# Patient Record
Sex: FEMALE | Race: WHITE | NOT HISPANIC OR LATINO | URBAN - METROPOLITAN AREA
[De-identification: names, ages, dates, MRNs, and addresses within clinical notes are randomized per-mention and may not be internally consistent; named-entity substitution may affect disease eponyms.]

---

## 2020-03-19 PROBLEM — 301716002 LEFT LOWER QUADRANT PAIN: Status: ACTIVE | Noted: 2019-12-09

## 2020-03-19 PROBLEM — 64226004: Status: ACTIVE | Noted: 2019-12-09

## 2020-03-19 PROBLEM — 441988000 IMAGING OF ABDOMEN ABNORMAL: Status: ACTIVE | Noted: 2019-12-09

## 2020-07-04 ENCOUNTER — WEB ENCOUNTER (OUTPATIENT)
Dept: URBAN - METROPOLITAN AREA CLINIC 33 | Facility: CLINIC | Age: 40
End: 2020-07-04

## 2020-07-06 ENCOUNTER — OFFICE VISIT (OUTPATIENT)
Dept: URBAN - METROPOLITAN AREA CLINIC 33 | Facility: CLINIC | Age: 40
End: 2020-07-06

## 2020-07-06 RX ORDER — LEVOCETIRIZINE DIHYDROCHLORIDE 5 MG/1
1 TABLET IN THE EVENING TABLET, FILM COATED ORAL ONCE A DAY
Qty: 30 | Status: ACTIVE | COMMUNITY

## 2020-07-06 RX ORDER — CALCIUM CARBONATE 500 MG/1
1 TABLET TABLET ORAL AS NEEDED
Status: ACTIVE | COMMUNITY

## 2020-07-06 RX ORDER — OMEPRAZOLE 20 MG/1
1 CAPSULE CAPSULE, DELAYED RELEASE ORAL ONCE A DAY
Qty: 90 CAPSULE | Status: ACTIVE | COMMUNITY

## 2020-07-06 RX ORDER — FAMOTIDINE 40 MG/1
1 TABLET AT BEDTIME AS NEEDED TABLET, FILM COATED ORAL ONCE A DAY
Qty: 90 TABLET | Refills: 1 | Status: ACTIVE | COMMUNITY
Start: 2019-12-09

## 2020-07-06 RX ORDER — OMEPRAZOLE 20 MG/1
AS DIRECTED TABLET, DELAYED RELEASE ORAL
Qty: 90 | Refills: 1 | Status: ACTIVE | COMMUNITY
Start: 2020-03-19

## 2020-07-06 RX ORDER — SUCRALFATE 1 G/1
1 TABLET ON AN EMPTY STOMACH TABLET ORAL TWICE A DAY
Qty: 60 | Refills: 2 | Status: ACTIVE | COMMUNITY
Start: 2020-03-19

## 2020-07-06 NOTE — HPI-MIGRATED HPI
;   ;   ;   ;     Early Satiety : Patient admits/denies epsiodes since last visit.   Last visit 12/9/2020) Patient c/o early satiety for the last 3 weeks. She admits that she has had a decreased appetite. ;   Heartburn :   ?Start Omeprazole 20 MG 1 QD, Sucralfate 1 GM, 1 BID Notes: Patient has been advised to take Sucralfate instead of Omeprazole due to patient's Colitis.        Last visit 12/9/2020)It has been controlled with medications ;   Abdominal Pain : Last visit Patient admit/denies epsiodes since last visit.    Last visit (12/9/2020) Patient admits LLQ abdominal pain since her last office visit only after eating fatty foods. She admits that her abdominal pain is all food related.      Patient admits that she tried following the Low FODMAP Diet.   Patient admits that she is taking Famotidine 20 mg. She denies that her symptoms are controlled  Last visit (12/9/2019) She admits episodes of abdominal pain has decreased since her last office visit. She admits that it ususal only hurts now when she eats something that she shouldn't   She denies having any labs completed or recent imaging.  She denies any new concerns at this time.   She has intolerance to fructose , lactose , fructans, galactans , polyols, sucrose    Last visit (10/4/2019) Patient admits abdominal pain that is located in her LLQ and is described as an intermittent sharp pain.  Pain started 08/22/19 and the patient states that she will have 2-3 episodes per day.    Patient admits 1-3 bowel movements a day. Stools are mostly soft. She denies any rectal bleeing, melena, mucus, rectal pain, or pruritus ani at this time.    Patient denies any aggravating factors. Patient admits Ibuprofen or a heating pad will alleviate her symptoms.    Patient denies the use of antibiotics within the last (3-6) months.    The patient admits ER visits for her pain.  Denies a family history of colon cancer or diseases/esophageal or gastric cancer or diseases.  Admits a past Colonoscopy with our office and she denies ever having an EGD.   Patient went to [facility] ER 09/2019 at which time she had a CT and labs done.  Impression:  1. No acute inflammatory process. 2. Prominent submucosal fat within the ascending thougth tranverse colon, non specific find but can be seen in the setting of chronic bowel inflammation.   She has had blood work completed with normal results.   ;   IBS : Patient presents today for a follow up of IBS-D.  She admits 2-3 bowel movements a day.  Denies straining. Stools are watery with no bleeding or mucous at this time.   ?Low FODMAP diet  Patient advised to avoid any fatty and/or greasy foods.              ;

## 2020-08-29 ENCOUNTER — WEB ENCOUNTER (OUTPATIENT)
Dept: URBAN - METROPOLITAN AREA CLINIC 35 | Facility: CLINIC | Age: 40
End: 2020-08-29

## 2020-10-06 ENCOUNTER — OFFICE VISIT (OUTPATIENT)
Dept: URBAN - METROPOLITAN AREA CLINIC 31 | Facility: CLINIC | Age: 40
End: 2020-10-06

## 2020-10-06 ENCOUNTER — DASHBOARD ENCOUNTERS (OUTPATIENT)
Age: 40
End: 2020-10-06

## 2020-10-06 VITALS — WEIGHT: 185 LBS | BODY MASS INDEX: 32.78 KG/M2 | HEIGHT: 63 IN

## 2020-10-06 RX ORDER — CALCIUM CARBONATE 500 MG/1
1 TABLET TABLET ORAL AS NEEDED
Status: ACTIVE | COMMUNITY

## 2020-10-06 RX ORDER — OMEPRAZOLE 20 MG/1
AS DIRECTED TABLET, DELAYED RELEASE ORAL
Qty: 90 | Refills: 1 | Status: DISCONTINUED | COMMUNITY
Start: 2020-03-19

## 2020-10-06 RX ORDER — LEVOCETIRIZINE DIHYDROCHLORIDE 5 MG/1
1 TABLET IN THE EVENING TABLET, FILM COATED ORAL ONCE A DAY
Qty: 30 | Status: ACTIVE | COMMUNITY

## 2020-10-06 RX ORDER — SUCRALFATE 1 G/1
1 TABLET ON AN EMPTY STOMACH TABLET ORAL TWICE A DAY
Qty: 60 | Refills: 2 | Status: DISCONTINUED | COMMUNITY
Start: 2020-03-19

## 2020-10-06 RX ORDER — FAMOTIDINE 40 MG/1
1 TABLET AT BEDTIME AS NEEDED TABLET, FILM COATED ORAL ONCE A DAY
OUTPATIENT
Start: 2019-12-09

## 2020-10-06 RX ORDER — FAMOTIDINE 40 MG/1
1 TABLET AT BEDTIME AS NEEDED TABLET, FILM COATED ORAL ONCE A DAY
Status: ACTIVE | COMMUNITY
Start: 2019-12-09

## 2020-10-06 NOTE — HPI-MIGRATED HPI
;   ;   ;   ;     Early Satiety : She admits occasional episodes of early satiety since her last visit. She admits her most recent episodes was 2 weeks ago.    Last visit (12/9/2019) Patient c/o early satiety for the last 3 weeks. She admits that she has had a decreased appetite. ;   Heartburn : She admits continued use of Famotidine 40 mg, BID, with good control of symptoms. She denies use of Sucralfate 1 GM.   Last visit (12/9/2019) It has been controlled with medications ;   Abdominal Pain : Patient denies episodes of LLQ abdominal pain since her last visit. She admits continued use of Famotidine 40 mg, 1 tablet BID with good control of her symptoms.     Last visit (03/19/2020) Patient admits LLQ abdominal pain since her last office visit only after eating fatty foods. She admits that her abdominal pain is all food related.      Patient admits that she tried following the Low FODMAP Diet.   Patient admits that she is taking Famotidine 20 mg. She denies that her symptoms are controlled  Last visit (12/9/2019) She admits episodes of abdominal pain has decreased since her last office visit. She admits that it usual only hurts now when she eats something that she shouldn't   She denies having any labs completed or recent imaging.  She denies any new concerns at this time.   She has intolerance to fructose , lactose , fructans, galactans , polyols, sucrose    Last visit (10/4/2019) Patient admits abdominal pain that is located in her LLQ and is described as an intermittent sharp pain.  Pain started 08/22/19 and the patient states that she will have 2-3 episodes per day.    Patient admits 1-3 bowel movements a day. Stools are mostly soft. She denies any rectal bleeding, melena, mucus, rectal pain, or pruritus ani at this time.    Patient denies any aggravating factors. Patient admits Ibuprofen or a heating pad will alleviate her symptoms.    Patient denies the use of antibiotics within the last (3-6) months.    The patient admits ER visits for her pain.  Denies a family history of colon cancer or diseases/esophageal or gastric cancer or diseases.  Admits a past Colonoscopy with our office and she denies ever having an EGD.   Patient went to [facility] ER 09/2019 at which time she had a CT and labs done.  Impression:  1. No acute inflammatory process. 2. Prominent submucosal fat within the ascending though transverse colon, non specific find but can be seen in the setting of chronic bowel inflammation.   She has had blood work completed with normal results.   ;   IBS : Patient presents today for a follow up of IBS-D. She admits 1-2 bowel movements per day, without strain. Stools are normal and formed. She denies blood, mucus, or melena in stool.  She admits continuing low FODMAP diet with signifcant improvement. She denies any consumption of fatty or greasy foods.   She has been intolerant to Sucrose    Last visit (03/19/2020) Patient presents today for a follow up of IBS-D.  She admits 2-3 bowel movements a day.  Denies straining. Stools are watery with no bleeding or mucous at this time.   Low FODMAP diet  Patient advised to avoid any fatty and/or greasy foods.              ;